# Patient Record
Sex: FEMALE | Race: WHITE | NOT HISPANIC OR LATINO | URBAN - METROPOLITAN AREA
[De-identification: names, ages, dates, MRNs, and addresses within clinical notes are randomized per-mention and may not be internally consistent; named-entity substitution may affect disease eponyms.]

---

## 2022-06-22 ENCOUNTER — EMERGENCY (EMERGENCY)
Facility: HOSPITAL | Age: 22
LOS: 1 days | Discharge: HOME | End: 2022-06-22
Attending: EMERGENCY MEDICINE | Admitting: EMERGENCY MEDICINE
Payer: COMMERCIAL

## 2022-06-22 VITALS
WEIGHT: 138.89 LBS | DIASTOLIC BLOOD PRESSURE: 62 MMHG | OXYGEN SATURATION: 98 % | SYSTOLIC BLOOD PRESSURE: 115 MMHG | RESPIRATION RATE: 18 BRPM | TEMPERATURE: 98 F | HEIGHT: 66 IN | HEART RATE: 87 BPM

## 2022-06-22 VITALS
TEMPERATURE: 98 F | HEART RATE: 71 BPM | OXYGEN SATURATION: 100 % | SYSTOLIC BLOOD PRESSURE: 125 MMHG | RESPIRATION RATE: 16 BRPM | DIASTOLIC BLOOD PRESSURE: 81 MMHG

## 2022-06-22 DIAGNOSIS — F90.9 ATTENTION-DEFICIT HYPERACTIVITY DISORDER, UNSPECIFIED TYPE: ICD-10-CM

## 2022-06-22 DIAGNOSIS — R10.13 EPIGASTRIC PAIN: ICD-10-CM

## 2022-06-22 DIAGNOSIS — R19.7 DIARRHEA, UNSPECIFIED: ICD-10-CM

## 2022-06-22 DIAGNOSIS — F41.9 ANXIETY DISORDER, UNSPECIFIED: ICD-10-CM

## 2022-06-22 DIAGNOSIS — R11.2 NAUSEA WITH VOMITING, UNSPECIFIED: ICD-10-CM

## 2022-06-22 DIAGNOSIS — R53.1 WEAKNESS: ICD-10-CM

## 2022-06-22 DIAGNOSIS — R63.0 ANOREXIA: ICD-10-CM

## 2022-06-22 DIAGNOSIS — R10.9 UNSPECIFIED ABDOMINAL PAIN: ICD-10-CM

## 2022-06-22 DIAGNOSIS — R53.83 OTHER FATIGUE: ICD-10-CM

## 2022-06-22 LAB
ALBUMIN SERPL ELPH-MCNC: 4.3 G/DL — SIGNIFICANT CHANGE UP (ref 3.3–5)
ALP SERPL-CCNC: 43 U/L — SIGNIFICANT CHANGE UP (ref 40–120)
ALT FLD-CCNC: 16 U/L — SIGNIFICANT CHANGE UP (ref 10–45)
ANION GAP SERPL CALC-SCNC: 8 MMOL/L — SIGNIFICANT CHANGE UP (ref 5–17)
APPEARANCE UR: CLEAR — SIGNIFICANT CHANGE UP
AST SERPL-CCNC: 16 U/L — SIGNIFICANT CHANGE UP (ref 10–40)
BACTERIA # UR AUTO: PRESENT /HPF
BASOPHILS # BLD AUTO: 0.02 K/UL — SIGNIFICANT CHANGE UP (ref 0–0.2)
BASOPHILS NFR BLD AUTO: 0.4 % — SIGNIFICANT CHANGE UP (ref 0–2)
BILIRUB SERPL-MCNC: 0.2 MG/DL — SIGNIFICANT CHANGE UP (ref 0.2–1.2)
BILIRUB UR-MCNC: NEGATIVE — SIGNIFICANT CHANGE UP
BUN SERPL-MCNC: 7 MG/DL — SIGNIFICANT CHANGE UP (ref 7–23)
CALCIUM SERPL-MCNC: 8.7 MG/DL — SIGNIFICANT CHANGE UP (ref 8.4–10.5)
CHLORIDE SERPL-SCNC: 106 MMOL/L — SIGNIFICANT CHANGE UP (ref 96–108)
CO2 SERPL-SCNC: 23 MMOL/L — SIGNIFICANT CHANGE UP (ref 22–31)
COLOR SPEC: YELLOW — SIGNIFICANT CHANGE UP
CREAT SERPL-MCNC: 0.73 MG/DL — SIGNIFICANT CHANGE UP (ref 0.5–1.3)
DIFF PNL FLD: NEGATIVE — SIGNIFICANT CHANGE UP
EGFR: 121 ML/MIN/1.73M2 — SIGNIFICANT CHANGE UP
EOSINOPHIL # BLD AUTO: 0.09 K/UL — SIGNIFICANT CHANGE UP (ref 0–0.5)
EOSINOPHIL NFR BLD AUTO: 1.9 % — SIGNIFICANT CHANGE UP (ref 0–6)
EPI CELLS # UR: ABNORMAL /HPF (ref 0–5)
GLUCOSE SERPL-MCNC: 90 MG/DL — SIGNIFICANT CHANGE UP (ref 70–99)
GLUCOSE UR QL: NEGATIVE — SIGNIFICANT CHANGE UP
HCT VFR BLD CALC: 40.2 % — SIGNIFICANT CHANGE UP (ref 34.5–45)
HGB BLD-MCNC: 13.3 G/DL — SIGNIFICANT CHANGE UP (ref 11.5–15.5)
IMM GRANULOCYTES NFR BLD AUTO: 0.2 % — SIGNIFICANT CHANGE UP (ref 0–1.5)
KETONES UR-MCNC: NEGATIVE — SIGNIFICANT CHANGE UP
LEUKOCYTE ESTERASE UR-ACNC: ABNORMAL
LIDOCAIN IGE QN: 30 U/L — SIGNIFICANT CHANGE UP (ref 7–60)
LYMPHOCYTES # BLD AUTO: 1.97 K/UL — SIGNIFICANT CHANGE UP (ref 1–3.3)
LYMPHOCYTES # BLD AUTO: 41.2 % — SIGNIFICANT CHANGE UP (ref 13–44)
MCHC RBC-ENTMCNC: 29.2 PG — SIGNIFICANT CHANGE UP (ref 27–34)
MCHC RBC-ENTMCNC: 33.1 GM/DL — SIGNIFICANT CHANGE UP (ref 32–36)
MCV RBC AUTO: 88.2 FL — SIGNIFICANT CHANGE UP (ref 80–100)
MONOCYTES # BLD AUTO: 0.41 K/UL — SIGNIFICANT CHANGE UP (ref 0–0.9)
MONOCYTES NFR BLD AUTO: 8.6 % — SIGNIFICANT CHANGE UP (ref 2–14)
NEUTROPHILS # BLD AUTO: 2.28 K/UL — SIGNIFICANT CHANGE UP (ref 1.8–7.4)
NEUTROPHILS NFR BLD AUTO: 47.7 % — SIGNIFICANT CHANGE UP (ref 43–77)
NITRITE UR-MCNC: NEGATIVE — SIGNIFICANT CHANGE UP
NRBC # BLD: 0 /100 WBCS — SIGNIFICANT CHANGE UP (ref 0–0)
PH UR: 7 — SIGNIFICANT CHANGE UP (ref 5–8)
PLATELET # BLD AUTO: 213 K/UL — SIGNIFICANT CHANGE UP (ref 150–400)
POTASSIUM SERPL-MCNC: 3.8 MMOL/L — SIGNIFICANT CHANGE UP (ref 3.5–5.3)
POTASSIUM SERPL-SCNC: 3.8 MMOL/L — SIGNIFICANT CHANGE UP (ref 3.5–5.3)
PROT SERPL-MCNC: 7 G/DL — SIGNIFICANT CHANGE UP (ref 6–8.3)
PROT UR-MCNC: NEGATIVE MG/DL — SIGNIFICANT CHANGE UP
RBC # BLD: 4.56 M/UL — SIGNIFICANT CHANGE UP (ref 3.8–5.2)
RBC # FLD: 12.7 % — SIGNIFICANT CHANGE UP (ref 10.3–14.5)
RBC CASTS # UR COMP ASSIST: < 5 /HPF — SIGNIFICANT CHANGE UP
SODIUM SERPL-SCNC: 137 MMOL/L — SIGNIFICANT CHANGE UP (ref 135–145)
SP GR SPEC: 1.01 — SIGNIFICANT CHANGE UP (ref 1–1.03)
UROBILINOGEN FLD QL: 0.2 E.U./DL — SIGNIFICANT CHANGE UP
WBC # BLD: 4.78 K/UL — SIGNIFICANT CHANGE UP (ref 3.8–10.5)
WBC # FLD AUTO: 4.78 K/UL — SIGNIFICANT CHANGE UP (ref 3.8–10.5)
WBC UR QL: < 5 /HPF — SIGNIFICANT CHANGE UP

## 2022-06-22 PROCEDURE — 80053 COMPREHEN METABOLIC PANEL: CPT

## 2022-06-22 PROCEDURE — 36415 COLL VENOUS BLD VENIPUNCTURE: CPT

## 2022-06-22 PROCEDURE — 81001 URINALYSIS AUTO W/SCOPE: CPT

## 2022-06-22 PROCEDURE — 99284 EMERGENCY DEPT VISIT MOD MDM: CPT | Mod: 25

## 2022-06-22 PROCEDURE — 76705 ECHO EXAM OF ABDOMEN: CPT

## 2022-06-22 PROCEDURE — 83690 ASSAY OF LIPASE: CPT

## 2022-06-22 PROCEDURE — 96361 HYDRATE IV INFUSION ADD-ON: CPT

## 2022-06-22 PROCEDURE — 76705 ECHO EXAM OF ABDOMEN: CPT | Mod: 26

## 2022-06-22 PROCEDURE — 99285 EMERGENCY DEPT VISIT HI MDM: CPT

## 2022-06-22 PROCEDURE — 96374 THER/PROPH/DIAG INJ IV PUSH: CPT

## 2022-06-22 PROCEDURE — 85025 COMPLETE CBC W/AUTO DIFF WBC: CPT

## 2022-06-22 RX ORDER — FAMOTIDINE 10 MG/ML
20 INJECTION INTRAVENOUS ONCE
Refills: 0 | Status: COMPLETED | OUTPATIENT
Start: 2022-06-22 | End: 2022-06-22

## 2022-06-22 RX ORDER — LIDOCAINE 4 G/100G
5 CREAM TOPICAL ONCE
Refills: 0 | Status: COMPLETED | OUTPATIENT
Start: 2022-06-22 | End: 2022-06-22

## 2022-06-22 RX ORDER — ONDANSETRON 8 MG/1
4 TABLET, FILM COATED ORAL ONCE
Refills: 0 | Status: COMPLETED | OUTPATIENT
Start: 2022-06-22 | End: 2022-06-22

## 2022-06-22 RX ORDER — SODIUM CHLORIDE 9 MG/ML
1000 INJECTION INTRAMUSCULAR; INTRAVENOUS; SUBCUTANEOUS ONCE
Refills: 0 | Status: COMPLETED | OUTPATIENT
Start: 2022-06-22 | End: 2022-06-22

## 2022-06-22 RX ORDER — OMEPRAZOLE 10 MG/1
1 CAPSULE, DELAYED RELEASE ORAL
Qty: 30 | Refills: 0
Start: 2022-06-22 | End: 2022-07-21

## 2022-06-22 RX ADMIN — SODIUM CHLORIDE 1000 MILLILITER(S): 9 INJECTION INTRAMUSCULAR; INTRAVENOUS; SUBCUTANEOUS at 14:38

## 2022-06-22 RX ADMIN — SODIUM CHLORIDE 1000 MILLILITER(S): 9 INJECTION INTRAMUSCULAR; INTRAVENOUS; SUBCUTANEOUS at 13:12

## 2022-06-22 RX ADMIN — Medication 30 MILLILITER(S): at 13:13

## 2022-06-22 RX ADMIN — ONDANSETRON 4 MILLIGRAM(S): 8 TABLET, FILM COATED ORAL at 14:17

## 2022-06-22 RX ADMIN — FAMOTIDINE 20 MILLIGRAM(S): 10 INJECTION INTRAVENOUS at 13:12

## 2022-06-22 RX ADMIN — LIDOCAINE 5 MILLILITER(S): 4 CREAM TOPICAL at 13:12

## 2022-06-22 NOTE — ED PROVIDER NOTE - NSFOLLOWUPINSTRUCTIONS_ED_ALL_ED_FT
Abdominal Pain    Many things can cause abdominal pain. Many times, abdominal pain is not caused by a disease and will improve without treatment. Your health care provider will do a physical exam to determine if there is a dangerous cause of your pain; blood tests and imaging may help determine the cause of your pain. However, in many cases, no cause may be found and you may need further testing as an outpatient. Monitor your abdominal pain for any changes.     SEEK IMMEDIATE MEDICAL CARE IF YOU HAVE ANY OF THE FOLLOWING SYMPTOMS: worsening abdominal pain, uncontrollable vomiting, profuse diarrhea, inability to have bowel movements or pass gas, black or bloody stools, fever accompanying chest pain or back pain, or fainting. These symptoms may represent a serious problem that is an emergency. Do not wait to see if the symptoms will go away. Get medical help right away. Call 911 and do not drive yourself to the hospital.     FOLLOW UP WITH YOUR GASTROENTEROLOGIST TOMORROW AS DISCUSSED.  TAKE THE OMEPRAZOLE AS DIRECTED.

## 2022-06-22 NOTE — ED ADULT TRIAGE NOTE - CHIEF COMPLAINT QUOTE
generalized abdominal pain, nausea, vomiting, diarrhea since Friday. went to Gastroenterologist today who sent her in. LMP may 30th. no hematuria, blood in stool, fevers, urinary symptoms.

## 2022-06-22 NOTE — ED PROVIDER NOTE - PROGRESS NOTE DETAILS
Spoke w/ PA in clinic who saw pt today. Reports pt had renal ultrasound in office and has not received results yet. Agrees w/ plan of basic labs, GI cocktail, and RUQ sono.

## 2022-06-22 NOTE — ED PROVIDER NOTE - CLINICAL SUMMARY MEDICAL DECISION MAKING FREE TEXT BOX
22 y/o F w/ 6 days of intermittent abd pain, anorexia, and vomiting at times. Abd pain most focused in epigastric region. Seen outpatient today and yesterday. Had outpatient ultrasound and told to go to ED for abd labs. Stable vitals. Looks well and hydrated. Given prominent epigastric pain, DDx includes GERD, gastritis, PUD, biliary process. Do not suspect appendicitis, surgical abdomen, or obstruction given clinical picture. Plan for labs, IV fluids, GI cocktails, RUQ sono and reassess. Will attempt to obtain US results from pt's doctor office.

## 2022-06-22 NOTE — ED PROVIDER NOTE - PATIENT PORTAL LINK FT
You can access the FollowMyHealth Patient Portal offered by [facility] by registering at the following website: [email or web address]. By joining [facility]’s FollowMyHealth portal, you will also be able to view your health information using other applications (apps) compatible with our system.

## 2022-06-22 NOTE — ED ADULT NURSE NOTE - OBJECTIVE STATEMENT
pt reports diffuse abdominal pain x 1 week.  pt was referred to GI who saw pt today and referred to ED.  pt reports N/V, last episode of vomiting was last night. pt states emesis non bloody appearing.  pt states last BM was yesterday.  pt states stool was hard and brown color.  pt denies fevers.  abdomen soft non tender to light palpation.

## 2022-06-22 NOTE — ED PROVIDER NOTE - GASTROINTESTINAL, MLM
Mild epigastric tenderness. Mild lower abdominal tenderness. Abdomen soft. No masses or hernias. No guarding or rebound.

## 2022-06-22 NOTE — ED PROVIDER NOTE - OBJECTIVE STATEMENT
20 y/o F w/ Hx of ADHD and anxiety, presents for 6 days of intermittent abd pain, nausea, and vomiting. Reports symptoms started 6 days ago after breakfast w/ increased sensation of abd fullness throughout the day. Decreased PO intake over the past 6 days. Reports increased generalized weakness and fatigue. Also endorses diarrhea 5 days ago w/ abd pain, nausea, increased burping, sensation of heartburn. Yesterday, pt woke up and vomited. Pt was seen outpatient yesterday afternoon and referred to see GI outpatient today. Reports she had in office ultrasound and was told by doctor to go to ED for further eval. Currently in ED, endorses intermittent abd pain and generalized weakness. Denies fever, chills, dysuria, flank pain. No Hx of gallstone issues. Did not take meds for symptoms. No sick contacts or recent travel.

## 2023-03-06 NOTE — ED ADULT NURSE REASSESSMENT NOTE - AS TEMP SITE
For medication: metoPROLOL succinate (TOPROL-XL) 50 MG 24 hr tablet    90 Day supply requested? Yes    Comments: In script bin    Pharmacy loaded below: Yes   oral

## 2023-11-03 NOTE — ED ADULT NURSE REASSESSMENT NOTE - PATIENT ON (OXYGEN DELIVERY METHOD)
room air Render In Strict Bullet Format?: No Detail Level: Zone Plan: Start Rx Jublia. Apply to affected toenails once daily